# Patient Record
Sex: MALE | Race: WHITE | Employment: FULL TIME | URBAN - METROPOLITAN AREA
[De-identification: names, ages, dates, MRNs, and addresses within clinical notes are randomized per-mention and may not be internally consistent; named-entity substitution may affect disease eponyms.]

---

## 2019-05-14 ENCOUNTER — HOSPITAL ENCOUNTER (OUTPATIENT)
Dept: RADIOLOGY | Facility: MEDICAL CENTER | Age: 44
End: 2019-05-14
Attending: PHYSICIAN ASSISTANT
Payer: COMMERCIAL

## 2019-05-14 DIAGNOSIS — M25.532 LEFT WRIST PAIN: ICD-10-CM

## 2019-05-14 PROCEDURE — 73221 MRI JOINT UPR EXTREM W/O DYE: CPT | Mod: LT

## 2019-12-09 ENCOUNTER — HOSPITAL ENCOUNTER (OUTPATIENT)
Dept: RADIOLOGY | Facility: MEDICAL CENTER | Age: 44
End: 2019-12-09
Attending: CHIROPRACTOR
Payer: COMMERCIAL

## 2019-12-09 DIAGNOSIS — M54.50 LOW BACK PAIN, UNSPECIFIED BACK PAIN LATERALITY, UNSPECIFIED CHRONICITY, UNSPECIFIED WHETHER SCIATICA PRESENT: ICD-10-CM

## 2019-12-09 PROCEDURE — 72148 MRI LUMBAR SPINE W/O DYE: CPT

## 2020-08-28 ENCOUNTER — APPOINTMENT (OUTPATIENT)
Dept: RADIOLOGY | Facility: MEDICAL CENTER | Age: 45
End: 2020-08-28
Attending: CHIROPRACTOR
Payer: COMMERCIAL

## 2020-08-28 DIAGNOSIS — M99.01 CERVICAL (NECK) REGION SOMATIC DYSFUNCTION: ICD-10-CM

## 2020-08-28 DIAGNOSIS — M25.551 RIGHT HIP PAIN: ICD-10-CM

## 2020-08-28 DIAGNOSIS — M54.2 CERVICAL PAIN: ICD-10-CM

## 2020-08-28 PROCEDURE — 72141 MRI NECK SPINE W/O DYE: CPT

## 2020-08-28 PROCEDURE — 73721 MRI JNT OF LWR EXTRE W/O DYE: CPT | Mod: RT

## 2020-10-14 ENCOUNTER — IMMUNIZATION (OUTPATIENT)
Dept: SOCIAL WORK | Facility: CLINIC | Age: 45
End: 2020-10-14
Payer: COMMERCIAL

## 2020-10-14 DIAGNOSIS — Z23 NEED FOR VACCINATION: ICD-10-CM

## 2020-10-14 PROCEDURE — 90686 IIV4 VACC NO PRSV 0.5 ML IM: CPT | Performed by: REGISTERED NURSE

## 2020-10-14 PROCEDURE — 90471 IMMUNIZATION ADMIN: CPT | Performed by: REGISTERED NURSE

## 2022-04-04 ENCOUNTER — HOSPITAL ENCOUNTER (OUTPATIENT)
Dept: RADIOLOGY | Facility: MEDICAL CENTER | Age: 47
End: 2022-04-04
Attending: ORTHOPAEDIC SURGERY
Payer: COMMERCIAL

## 2022-04-06 ENCOUNTER — APPOINTMENT (OUTPATIENT)
Dept: RADIOLOGY | Facility: MEDICAL CENTER | Age: 47
End: 2022-04-06
Attending: ORTHOPAEDIC SURGERY
Payer: COMMERCIAL

## 2022-04-06 DIAGNOSIS — S69.82XA TFCC (TRIANGULAR FIBROCARTILAGE COMPLEX) INJURY, LEFT, INITIAL ENCOUNTER: ICD-10-CM

## 2022-04-06 PROCEDURE — 73221 MRI JOINT UPR EXTREM W/O DYE: CPT | Mod: LT

## 2025-02-21 SDOH — HEALTH STABILITY: PHYSICAL HEALTH: ON AVERAGE, HOW MANY DAYS PER WEEK DO YOU ENGAGE IN MODERATE TO STRENUOUS EXERCISE (LIKE A BRISK WALK)?: 6 DAYS

## 2025-02-21 SDOH — ECONOMIC STABILITY: INCOME INSECURITY: IN THE LAST 12 MONTHS, WAS THERE A TIME WHEN YOU WERE NOT ABLE TO PAY THE MORTGAGE OR RENT ON TIME?: NO

## 2025-02-21 SDOH — ECONOMIC STABILITY: HOUSING INSECURITY
IN THE LAST 12 MONTHS, WAS THERE A TIME WHEN YOU DID NOT HAVE A STEADY PLACE TO SLEEP OR SLEPT IN A SHELTER (INCLUDING NOW)?: NO

## 2025-02-21 SDOH — ECONOMIC STABILITY: INCOME INSECURITY: HOW HARD IS IT FOR YOU TO PAY FOR THE VERY BASICS LIKE FOOD, HOUSING, MEDICAL CARE, AND HEATING?: NOT HARD AT ALL

## 2025-02-21 SDOH — ECONOMIC STABILITY: TRANSPORTATION INSECURITY
IN THE PAST 12 MONTHS, HAS LACK OF TRANSPORTATION KEPT YOU FROM MEETINGS, WORK, OR FROM GETTING THINGS NEEDED FOR DAILY LIVING?: NO

## 2025-02-21 SDOH — ECONOMIC STABILITY: FOOD INSECURITY: WITHIN THE PAST 12 MONTHS, THE FOOD YOU BOUGHT JUST DIDN'T LAST AND YOU DIDN'T HAVE MONEY TO GET MORE.: NEVER TRUE

## 2025-02-21 SDOH — ECONOMIC STABILITY: TRANSPORTATION INSECURITY
IN THE PAST 12 MONTHS, HAS THE LACK OF TRANSPORTATION KEPT YOU FROM MEDICAL APPOINTMENTS OR FROM GETTING MEDICATIONS?: NO

## 2025-02-21 SDOH — ECONOMIC STABILITY: FOOD INSECURITY: WITHIN THE PAST 12 MONTHS, YOU WORRIED THAT YOUR FOOD WOULD RUN OUT BEFORE YOU GOT MONEY TO BUY MORE.: NEVER TRUE

## 2025-02-21 SDOH — HEALTH STABILITY: PHYSICAL HEALTH: ON AVERAGE, HOW MANY MINUTES DO YOU ENGAGE IN EXERCISE AT THIS LEVEL?: 40 MIN

## 2025-02-21 SDOH — ECONOMIC STABILITY: TRANSPORTATION INSECURITY
IN THE PAST 12 MONTHS, HAS LACK OF RELIABLE TRANSPORTATION KEPT YOU FROM MEDICAL APPOINTMENTS, MEETINGS, WORK OR FROM GETTING THINGS NEEDED FOR DAILY LIVING?: NO

## 2025-02-21 SDOH — HEALTH STABILITY: MENTAL HEALTH
STRESS IS WHEN SOMEONE FEELS TENSE, NERVOUS, ANXIOUS, OR CAN'T SLEEP AT NIGHT BECAUSE THEIR MIND IS TROUBLED. HOW STRESSED ARE YOU?: ONLY A LITTLE

## 2025-02-21 ASSESSMENT — SOCIAL DETERMINANTS OF HEALTH (SDOH)
HOW OFTEN DO YOU HAVE SIX OR MORE DRINKS ON ONE OCCASION: NEVER
HOW OFTEN DO YOU ATTENT MEETINGS OF THE CLUB OR ORGANIZATION YOU BELONG TO?: MORE THAN 4 TIMES PER YEAR
HOW OFTEN DO YOU GET TOGETHER WITH FRIENDS OR RELATIVES?: THREE TIMES A WEEK
IN A TYPICAL WEEK, HOW MANY TIMES DO YOU TALK ON THE PHONE WITH FAMILY, FRIENDS, OR NEIGHBORS?: MORE THAN THREE TIMES A WEEK
DO YOU BELONG TO ANY CLUBS OR ORGANIZATIONS SUCH AS CHURCH GROUPS UNIONS, FRATERNAL OR ATHLETIC GROUPS, OR SCHOOL GROUPS?: YES
IN A TYPICAL WEEK, HOW MANY TIMES DO YOU TALK ON THE PHONE WITH FAMILY, FRIENDS, OR NEIGHBORS?: MORE THAN THREE TIMES A WEEK
HOW MANY DRINKS CONTAINING ALCOHOL DO YOU HAVE ON A TYPICAL DAY WHEN YOU ARE DRINKING: 1 OR 2
HOW OFTEN DO YOU ATTEND CHURCH OR RELIGIOUS SERVICES?: NEVER
HOW OFTEN DO YOU ATTENT MEETINGS OF THE CLUB OR ORGANIZATION YOU BELONG TO?: MORE THAN 4 TIMES PER YEAR
HOW OFTEN DO YOU HAVE A DRINK CONTAINING ALCOHOL: MONTHLY OR LESS
DO YOU BELONG TO ANY CLUBS OR ORGANIZATIONS SUCH AS CHURCH GROUPS UNIONS, FRATERNAL OR ATHLETIC GROUPS, OR SCHOOL GROUPS?: YES
HOW OFTEN DO YOU ATTEND CHURCH OR RELIGIOUS SERVICES?: NEVER
HOW HARD IS IT FOR YOU TO PAY FOR THE VERY BASICS LIKE FOOD, HOUSING, MEDICAL CARE, AND HEATING?: NOT HARD AT ALL
IN THE PAST 12 MONTHS, HAS THE ELECTRIC, GAS, OIL, OR WATER COMPANY THREATENED TO SHUT OFF SERVICE IN YOUR HOME?: NO
HOW OFTEN DO YOU GET TOGETHER WITH FRIENDS OR RELATIVES?: THREE TIMES A WEEK
WITHIN THE PAST 12 MONTHS, YOU WORRIED THAT YOUR FOOD WOULD RUN OUT BEFORE YOU GOT THE MONEY TO BUY MORE: NEVER TRUE

## 2025-02-21 ASSESSMENT — LIFESTYLE VARIABLES
HOW OFTEN DO YOU HAVE A DRINK CONTAINING ALCOHOL: MONTHLY OR LESS
HOW OFTEN DO YOU HAVE SIX OR MORE DRINKS ON ONE OCCASION: NEVER
SKIP TO QUESTIONS 9-10: 1
HOW MANY STANDARD DRINKS CONTAINING ALCOHOL DO YOU HAVE ON A TYPICAL DAY: 1 OR 2
AUDIT-C TOTAL SCORE: 1

## 2025-02-25 ENCOUNTER — OFFICE VISIT (OUTPATIENT)
Dept: SPORTS MEDICINE | Facility: OTHER | Age: 50
End: 2025-02-25
Payer: COMMERCIAL

## 2025-02-25 VITALS
SYSTOLIC BLOOD PRESSURE: 118 MMHG | OXYGEN SATURATION: 97 % | RESPIRATION RATE: 16 BRPM | BODY MASS INDEX: 25.06 KG/M2 | HEIGHT: 72 IN | WEIGHT: 185 LBS | DIASTOLIC BLOOD PRESSURE: 80 MMHG | HEART RATE: 76 BPM | TEMPERATURE: 98.1 F

## 2025-02-25 DIAGNOSIS — S39.81XA SPORTS HERNIA, INITIAL ENCOUNTER: ICD-10-CM

## 2025-02-25 ASSESSMENT — ENCOUNTER SYMPTOMS
DIZZINESS: 0
FEVER: 0
NAUSEA: 0
VOMITING: 0
SHORTNESS OF BREATH: 0
CHILLS: 0

## 2025-02-25 NOTE — PROGRESS NOTES
Chief Complaint   Patient presents with    Groin Injury     Groin injury      Subjective     Referred by Laura Lacey MD for evaluation of LEFT groin pain  History of LEFT groin pain  Acute injury, felt a pull while pushing off hard while playing hockey   Initial injury approximately 6 months ago (Roughly August 2024)  Pain is achy  Worse with running and particularly propulsion during gait cycle  Improved with rest and avoiding physical activity  No pain at rest  More severe with activity  Pain is predominately along the LEFT groin and LEFT pelvic region  He denies any numbness or tingling  He underwent MRI as well as x-ray at [facility]  He will has been treated by Dr. Lacey has undergone hip injection with NO improvement in symptoms  He has tried rest, anti-inflammatories, stretching, scraping, heat and cold, polypeptide's orally, manual therapy, physical therapy with NO improvement in symptoms  Known history of prior herniated disc at L4-5 on the RIGHT side with sciatica    Also having some issues with shoulder pain    Please hockey recreationally, physically active, weightlifting    Review of Systems   Constitutional:  Negative for chills and fever.   Respiratory:  Negative for shortness of breath.    Cardiovascular:  Negative for chest pain.   Gastrointestinal:  Negative for nausea and vomiting.   Neurological:  Negative for dizziness.     PMH:  has no past medical history on file.  MEDS: No current outpatient medications on file.  ALLERGIES: No Known Allergies  SURGHX: No past surgical history on file.  SOCHX:  reports that he has never smoked. He has never used smokeless tobacco.  FH: Family history was reviewed, no pertinent findings to report    Objective   /80 (BP Location: Left arm, Patient Position: Sitting, BP Cuff Size: Adult)   Pulse 76   Temp 36.7 °C (98.1 °F) (Temporal)   Resp 16   Ht 1.829 m (6')   Wt 83.9 kg (185 lb)   SpO2 97%   BMI 25.09 kg/m²     No  acute distress  Normal gait  POSITIVE Carnett's sign on the LEFT  POSITIVE tenderness at the LEFT distal rectus  Positive tenderness at the inferior pubic rami in the LEFT along with tenderness along the trajectory of the adductor  POSITIVE pain with resisted hip adduction with the knees in flexion as well as with the knees extended    1. Sports hernia, initial encounter (LEFT distal rectus and LEFT adductor at the origin)          History of LEFT groin pain  Acute injury, felt a pull while pushing off hard while playing hockey   Initial injury approximately 6 months ago (Roughly August 2024)  Pain is achy        No follow-ups on file.              Patient Name: VIVEK GOETZ  Patient Medical Record Number: [id number]  YOB: 1975  Exam Date: 11/19/2024  Referring Physician: Laura Lacey MD  Referring NPI: [id number]  Accession: [id number]  Exam Description: MR-Pelvis without contrast  Equipment: Siemens Aera 1.5T MRI  Exam location: [address] [place] [address]    Clinical Indication:  S76.012A Strain Of Muscle, Fascia And Tendon Of Left Hip.    Technique:  Multiple acquisition parameters were performed to evaluate the pelvis utilizing the Siemens Aera 1.5T MRI. No IV contrast was administered.    Comparison:  None.    Findings:  There is no evidence of sacral fracture. Sacroiliac joints are preserved. No bony erosions or ankylosis. Levoscoliosis of the lower lumbar spine is partially visualized with mild degenerative disc disease at L4-5 and L5-S1. There is a disc protrusion with annular at L4-5, not well assessed on this examination but possibly resulting in central canal stenosis and mild bilateral neural foraminal narrowing. Pubic symphysis is intact. Pubic rami are preserved. Visualized intrapelvic contents demonstrate no mass, free fluid, or lymphadenopathy.  There is no evidence of inguinal hernia. There is no evidence of femoral neck fracture. Shallow bilateral acetabulum suggesting hip  dysplasia. No joint effusion. No evidence of avascular necrosis. No loose bodies.  Please note examination was not optimized for evaluation of hip labrum. There is decreased femoral head/neck offset bilaterally suggesting CAM type femoroacetabular impingement. The rectus femoris, iliopsoas, hamstrings, and gluteus medius and minimus tendons are intact bilaterally without tendinopathy or tear. There is no intramuscular edema. No muscle strain.    Impression:  1. Evidence of bilateral hip dysplasia, as well as CAM type femoroacetabular impingement. No acute bony abnormality. Please note examination was not optimized for evaluation of hip labrum.  2. Disc protrusion at L4-5, not well assessed on this examination but likely resulting in central canal stenosis and neural foraminal narrowing. Consider MRI of the lumbar spine for further evaluation.    Electronically Signed  By: Duane Garcia MD            Patient Name: VIVEK GOETZ  Patient Medical Record Number: [id number]  YOB: 1975  Exam Date: 11/19/2024  Referring Physician: Laura Lacey MD  Referring NPI: [id number]  Accession: [id number]  Exam Description: XR-Hip 2V Left AP Pelvis, Lateral - Left  Equipment: KDR Advanced U-Arm  Exam location: [address] [place] [address]    Clinical Indication:  S76.012A Strain Of Muscle, Fascia And Tendon Of Left Hip.    Technique:  AP and lateral views of the left hip were performed.    Comparison:  None.    Findings:  There is no evidence of acute fracture or dislocation. Femoral neck is intact. No bony erosions. Mild osteoarthritic changes of the left hip are present with joint space narrowing and osteophyte formation. No loose bodies are identified. No evidence of joint effusion.    Impression:  Mild osteoarthritic changes of the left hip without acute abnormality.    Electronically Signed  By: Duane Garcia MD    Interpreted in the office today with the patient    Thank you Laura Lacey MD for allowing  me to participate in caring for your patient.    CC:  Laura Lacey MD   [facility]  [address] [place], [place] [address] [address]  Phone: [phone]  Fax: [phone]

## 2025-03-11 ENCOUNTER — OFFICE VISIT (OUTPATIENT)
Dept: MEDICAL GROUP | Facility: OTHER | Age: 50
End: 2025-03-11

## 2025-03-11 VITALS
TEMPERATURE: 97.2 F | DIASTOLIC BLOOD PRESSURE: 68 MMHG | HEART RATE: 60 BPM | BODY MASS INDEX: 28.28 KG/M2 | WEIGHT: 202 LBS | SYSTOLIC BLOOD PRESSURE: 108 MMHG | HEIGHT: 71 IN | OXYGEN SATURATION: 95 %

## 2025-03-11 DIAGNOSIS — S76.202S: ICD-10-CM

## 2025-03-11 PROCEDURE — 0232T NJX PLATELET PLASMA: CPT | Mod: GC | Performed by: STUDENT IN AN ORGANIZED HEALTH CARE EDUCATION/TRAINING PROGRAM

## 2025-03-11 PROCEDURE — 3078F DIAST BP <80 MM HG: CPT | Performed by: STUDENT IN AN ORGANIZED HEALTH CARE EDUCATION/TRAINING PROGRAM

## 2025-03-11 PROCEDURE — 3074F SYST BP LT 130 MM HG: CPT | Performed by: STUDENT IN AN ORGANIZED HEALTH CARE EDUCATION/TRAINING PROGRAM

## 2025-03-11 NOTE — PROGRESS NOTES
Subjective:   Ricardo Barkley is a 49 y.o. male here for the evaluation and management of Injections (PRP of the left groin)    HPI  Left groin strain  Presenting with a left groin strain that started in July 2024.  He reports that he had a second injury in August that worsen the pain.  He has been in pain over the last 5 months has tried physical therapy without relief  He had a cortisone shot in his hip mid December without any relief  Hip adduction and flexion worsen the pain it is in the left groin  He doesn't have pain with abdominal crunches   Rolling over in bed worsens the pain    ROS  See above  No current outpatient medications on file.     No current facility-administered medications for this visit.       Patient has no known allergies.    No past medical history on file.  There are no active problems to display for this patient.      Past Surgical History  No past surgical history on file.    Social History     Socioeconomic History    Marital status: Single     Spouse name: Not on file    Number of children: Not on file    Years of education: Not on file    Highest education level: Bachelor's degree (e.g., BA, AB, BS)   Occupational History    Not on file   Tobacco Use    Smoking status: Never    Smokeless tobacco: Never   Substance and Sexual Activity    Alcohol use: Not on file    Drug use: Not on file    Sexual activity: Not on file   Other Topics Concern    Not on file   Social History Narrative    Not on file     Social Drivers of Health     Financial Resource Strain: Low Risk  (2/21/2025)    Overall Financial Resource Strain (CARDIA)     Difficulty of Paying Living Expenses: Not hard at all   Food Insecurity: No Food Insecurity (2/21/2025)    Hunger Vital Sign     Worried About Running Out of Food in the Last Year: Never true     Ran Out of Food in the Last Year: Never true   Transportation Needs: No Transportation Needs (2/21/2025)    PRAPARE - Transportation     Lack of Transportation  "(Medical): No     Lack of Transportation (Non-Medical): No   Physical Activity: Sufficiently Active (2/21/2025)    Exercise Vital Sign     Days of Exercise per Week: 6 days     Minutes of Exercise per Session: 40 min   Stress: No Stress Concern Present (2/21/2025)    [employer] - Occupational Stress Questionnaire     Feeling of Stress : Only a little   Social Connections: Moderately Integrated (2/21/2025)    Social Connection and Isolation Panel [NHANES]     Frequency of Communication with Friends and Family: More than three times a week     Frequency of Social Gatherings with Friends and Family: Three times a week     Attends Sikhism Services: Never     Active Member of Clubs or Organizations: Yes     Attends Club or Organization Meetings: More than 4 times per year     Marital Status:    Intimate Partner Violence: Not on file   Housing Stability: Low Risk  (2/21/2025)    Housing Stability Vital Sign     Unable to Pay for Housing in the Last Year: No     Number of Times Moved in the Last Year: 0     Homeless in the Last Year: No        Objective:     Vitals:    03/11/25 0822   BP: 108/68   BP Location: Right arm   Patient Position: Sitting   Pulse: 60   Temp: 36.2 °C (97.2 °F)   SpO2: 95%   Weight: 91.6 kg (202 lb)   Height: 1.803 m (5' 11\")     Body mass index is 28.17 kg/m².     Physical Exam  The patient has tenderness to palpation over the left groin just distal to the pubic symphysis  He is full range of motion but pain is worse with hip adduction  No pain with abdominal crunches    --------Procedure: Left Proximal Adductor PRP Injection-----------  Written and verbal consent for the procedure was obtained after discussing the benefits and risks involved.  Positioning: Supine with left hip in the MAY position  Sterilization: Chlorhexidine swab x 3  Anesthesia: None  Medication injected: PRP (5.25cc)  Procedure:  After centrifugation approximately 5.25 mL of usable PRP " was drawn out. The pubic symphysis and adductor defect was identified under ultrasound and the area prepped with chlorhexidine. The 25-gauge needle was inserted into the intended space and 5.25 cc were injected into the defect. Expansion of the defect was noted on injection.     Aspiration: A total of 0 ml was aspirated from the joint prior to injection  Complications: None  The site was dressed with a band-aid.  The patient tolerated the procedure well.  Wound care instructions were given verbally.  ------------------------------------------------------------------------------------    Assessment and Plan:   Ricardo Barkley is a 49 y.o. male with a Injections (PRP of the left groin)     The following was discussed with the patient today.    1. Injury of adductor muscle and tendon of thigh, left, sequela  -The patient presented for PRP injection into the left groin  -MRI head demonstrated increased signal in the left proximal adductor region just distal to the pubic symphysis.  Ultrasound last week had demonstrated a large defect in that area  -Risks and benefits of PRP injection discussed with patient at length.  He opted to proceed with the injection.  Due to his pain being just distal to the pubic symphysis and the fact that he had no pain with abdominal crunches it was decided to inject the total amount of the PRP into the proximal adductor defect  -See procedure note above.  The patient tolerated the procedure well  -PRP handout provided to the patient.  Recommend that he avoid anti-inflammatories for pain is much as possible.  Advised patient that he should not stretch through the pain but should strengthen to the pain  -Return precautions discussed with patient should symptoms worsen    Followup: Return if symptoms worsen or fail to improve.    Aaron Urbano MD   [facility]   Sports Medicine PGY-4     The patient was evaluated with attending physician Dr. Valverde

## 2025-04-12 ENCOUNTER — HOSPITAL ENCOUNTER (OUTPATIENT)
Dept: RADIOLOGY | Facility: MEDICAL CENTER | Age: 50
End: 2025-04-12
Attending: INTERNAL MEDICINE
Payer: COMMERCIAL

## 2025-04-12 DIAGNOSIS — M54.59 MECHANICAL LOW BACK PAIN: ICD-10-CM

## 2025-04-12 PROCEDURE — 72148 MRI LUMBAR SPINE W/O DYE: CPT
